# Patient Record
Sex: FEMALE | Race: WHITE | NOT HISPANIC OR LATINO | URBAN - METROPOLITAN AREA
[De-identification: names, ages, dates, MRNs, and addresses within clinical notes are randomized per-mention and may not be internally consistent; named-entity substitution may affect disease eponyms.]

---

## 2023-10-18 PROBLEM — E87.5 HYPERKALEMIA: Status: ACTIVE | Noted: 2019-06-12

## 2023-10-18 PROBLEM — Z86.0100 PERSONAL HISTORY OF COLONIC POLYPS: Status: ACTIVE | Noted: 2023-10-18

## 2023-10-18 PROBLEM — N63.0 BREAST LUMP: Status: ACTIVE | Noted: 2023-10-18

## 2023-10-18 PROBLEM — E83.52 SERUM CALCIUM ELEVATED: Status: ACTIVE | Noted: 2023-10-18

## 2023-10-18 PROBLEM — M81.0 OSTEOPOROSIS WITHOUT CURRENT PATHOLOGICAL FRACTURE: Status: ACTIVE | Noted: 2023-10-18

## 2023-10-18 PROBLEM — E55.9 VITAMIN D DEFICIENCY: Status: ACTIVE | Noted: 2023-10-18

## 2023-10-18 PROBLEM — R82.90 ABNORMAL URINE: Status: ACTIVE | Noted: 2019-12-24

## 2023-10-18 PROBLEM — N95.1 POSTMENOPAUSAL SYNDROME: Status: ACTIVE | Noted: 2023-10-18

## 2023-10-18 PROBLEM — E66.3 OVERWEIGHT: Status: ACTIVE | Noted: 2019-06-12

## 2023-10-18 PROBLEM — R19.5 PENCILLING OF STOOLS: Status: ACTIVE | Noted: 2021-01-14

## 2023-10-18 PROBLEM — R31.9 HEMATURIA: Status: ACTIVE | Noted: 2021-01-14

## 2023-10-18 PROBLEM — Z86.010 PERSONAL HISTORY OF COLONIC POLYPS: Status: ACTIVE | Noted: 2023-10-18

## 2023-10-18 PROBLEM — M81.0 AGE RELATED OSTEOPOROSIS: Status: ACTIVE | Noted: 2020-08-03

## 2023-10-18 RX ORDER — ACETAMINOPHEN 500 MG
1 TABLET ORAL DAILY
COMMUNITY

## 2023-10-18 RX ORDER — ALBUTEROL SULFATE 90 UG/1
2 AEROSOL, METERED RESPIRATORY (INHALATION) EVERY 4 HOURS PRN
COMMUNITY
End: 2024-03-31 | Stop reason: SDUPTHER

## 2023-10-18 RX ORDER — ALENDRONATE SODIUM 70 MG/1
1 TABLET ORAL
COMMUNITY
Start: 2023-04-27 | End: 2023-10-19 | Stop reason: WASHOUT

## 2023-10-19 ENCOUNTER — OFFICE VISIT (OUTPATIENT)
Dept: PRIMARY CARE | Facility: CLINIC | Age: 78
End: 2023-10-19
Payer: MEDICARE

## 2023-10-19 VITALS
TEMPERATURE: 96.6 F | BODY MASS INDEX: 21.85 KG/M2 | HEIGHT: 64 IN | HEART RATE: 80 BPM | WEIGHT: 128 LBS | OXYGEN SATURATION: 97 % | SYSTOLIC BLOOD PRESSURE: 122 MMHG | DIASTOLIC BLOOD PRESSURE: 80 MMHG

## 2023-10-19 DIAGNOSIS — H61.21 IMPACTED CERUMEN OF RIGHT EAR: Primary | ICD-10-CM

## 2023-10-19 DIAGNOSIS — H57.9 ITCHY EYES: ICD-10-CM

## 2023-10-19 PROCEDURE — 69210 REMOVE IMPACTED EAR WAX UNI: CPT | Performed by: STUDENT IN AN ORGANIZED HEALTH CARE EDUCATION/TRAINING PROGRAM

## 2023-10-19 PROCEDURE — 99213 OFFICE O/P EST LOW 20 MIN: CPT | Performed by: STUDENT IN AN ORGANIZED HEALTH CARE EDUCATION/TRAINING PROGRAM

## 2023-10-19 PROCEDURE — 1036F TOBACCO NON-USER: CPT | Performed by: STUDENT IN AN ORGANIZED HEALTH CARE EDUCATION/TRAINING PROGRAM

## 2023-10-19 PROCEDURE — 1126F AMNT PAIN NOTED NONE PRSNT: CPT | Performed by: STUDENT IN AN ORGANIZED HEALTH CARE EDUCATION/TRAINING PROGRAM

## 2023-10-19 PROCEDURE — 1159F MED LIST DOCD IN RCRD: CPT | Performed by: STUDENT IN AN ORGANIZED HEALTH CARE EDUCATION/TRAINING PROGRAM

## 2023-10-19 ASSESSMENT — PAIN SCALES - GENERAL: PAINLEVEL: 0-NO PAIN

## 2023-10-19 ASSESSMENT — PATIENT HEALTH QUESTIONNAIRE - PHQ9
SUM OF ALL RESPONSES TO PHQ9 QUESTIONS 1 AND 2: 0
2. FEELING DOWN, DEPRESSED OR HOPELESS: NOT AT ALL
1. LITTLE INTEREST OR PLEASURE IN DOING THINGS: NOT AT ALL

## 2023-10-19 NOTE — PATIENT INSTRUCTIONS
Thank you for coming to see me today.    Successful Earwax removal in clinic today.    Use over-the-counter DEBROX ear drops to help remove small amount of residual wax.    Try allergy medication such as CLARITIN or ZYRTEC for itchy eye/ear symptoms which are likely allergy-related due to recent cat exposure.

## 2023-10-19 NOTE — PROGRESS NOTES
"Subjective   Flor Soto is a 78 y.o. female who presents for ear pain, eyes itching.    HPI:     Here with complaint of R ear pain and sensation of plugging.      There is no immunization history on file for this patient.      ROS:   Review of systems is essentially negative for all systems except for any identified issues in HPI above.      There is no immunization history on file for this patient.      Objective     /80   Pulse 80   Temp 35.9 °C (96.6 °F)   Ht 1.626 m (5' 4\")   Wt 58.1 kg (128 lb)   SpO2 97%   BMI 21.97 kg/m²      PHYSICAL EXAM    GENERAL  Well-appearing, pleasant and cooperative.  No acute distress.    HEENT  HEAD:   Normocephalic.  Atraumatic.  EYES:  PERRLA.  No scleral icterus or conjunctival injection.  EARS:  R cerumen impaction.  L Tympanic membranes visualized bilaterally without erythema, fluid, or bulging.  NECK:  No adenopathy.  No palpable thyroid enlargement or nodules.    THROAT:  Moist oropharynx without tonsillar enlargement or exudates.    LUNGS:    Clear to auscultation bilaterally.  No wheezes, rales, rhonchi.    CARDIAC:  Regular rate and rhythm.  Normal S1S2.  No murmurs/rubs/gallops.    ABDOMEN:  Soft, non-tender, non-distended.  No hepatosplenomegaly.  Normoactive bowel sounds.    MUSCULOSKELETAL:  No gross abnormalities.   No joint swelling or erythema,.  No spinal or paraspinal tenderness to palpation.    EXTREMITIES:  No LE edema or cyanosis.      NEURO           Alert and oriented x3. No focal deficits.    PSYCH:          Affect appropriate.           Ear Cerumen Removal    Date/Time: 10/19/2023 10:05 AM    Performed by: Chary Whaley MD  Authorized by: Chary Whaley MD    Consent:     Consent obtained:  Verbal    Consent given by:  Patient    Risks, benefits, and alternatives were discussed: yes      Risks discussed:  Dizziness and incomplete removal    Alternatives discussed:  No treatment and referral  Universal protocol:     Procedure " explained and questions answered to patient or proxy's satisfaction: no      Patient identity confirmed:  Verbally with patient  Procedure details:     Location:  R ear    Procedure type: curette      Procedure type comment:  Irrigation followed by curette x2    Procedure outcomes: cerumen removed    Post-procedure details:     Inspection:  TM intact    Hearing quality:  Improved    Procedure completion:  Tolerated        Assessment/Plan   Problem List Items Addressed This Visit    None  Visit Diagnoses       Impacted cerumen of right ear    -  Primary    Relevant Orders    Ear Cerumen Removal    Itchy eyes            Suspect allergic cause of itchy eye symptoms.  OTC antihistimine recommended.         Chary Whaley MD

## 2024-02-09 ENCOUNTER — TELEPHONE (OUTPATIENT)
Dept: PRIMARY CARE | Facility: CLINIC | Age: 79
End: 2024-02-09
Payer: MEDICARE

## 2024-02-09 DIAGNOSIS — Z76.0 MEDICATION REFILL: ICD-10-CM

## 2024-02-09 RX ORDER — ALENDRONATE SODIUM 70 MG/1
70 TABLET ORAL
COMMUNITY
Start: 2023-11-26 | End: 2024-02-09 | Stop reason: SDUPTHER

## 2024-02-09 RX ORDER — ALENDRONATE SODIUM 70 MG/1
70 TABLET ORAL
Qty: 12 TABLET | Refills: 0 | Status: SHIPPED | OUTPATIENT
Start: 2024-02-09

## 2024-02-09 NOTE — TELEPHONE ENCOUNTER
PT states she was in around April 2023 and did her bone density scan.  Was advised that an RX would be sent at that time, but PT never picked it up.  PT requesting new RX be sent as she would like to start taking this medication.  Please send to [facility] on [address]

## 2024-03-31 ENCOUNTER — TELEPHONE (OUTPATIENT)
Dept: PRIMARY CARE | Facility: CLINIC | Age: 79
End: 2024-03-31
Payer: MEDICARE

## 2024-03-31 DIAGNOSIS — Z76.0 MEDICATION REFILL: Primary | ICD-10-CM

## 2024-03-31 RX ORDER — ALBUTEROL SULFATE 90 UG/1
2 AEROSOL, METERED RESPIRATORY (INHALATION) EVERY 4 HOURS PRN
Qty: 8 G | Refills: 0 | OUTPATIENT
Start: 2024-03-31

## 2024-03-31 NOTE — TELEPHONE ENCOUNTER
AFTER HOURS CALL: Pt called for an Albuterol HFA RF. She stated that she was experiencing SOB. Pt advised that she should be seen in UC or ER, and she stated that she just needed Albuterol RF. Rx called into pharmacy.

## 2024-04-01 NOTE — TELEPHONE ENCOUNTER
Call placed to patient, states she is doing much better and that the SOB was due to asthma. Pt scheduled medicare wellness exam for next Wednesday.

## 2024-04-01 NOTE — TELEPHONE ENCOUNTER
Noted.  Please call patient to check on symptoms/arrange follow up if needed.  Last CPE 1 year ago (3/24/2023), I would still encourage her to schedule a medicare wellness visit so we can review medications and other issues regardless.

## 2024-04-10 ENCOUNTER — APPOINTMENT (OUTPATIENT)
Dept: PRIMARY CARE | Facility: CLINIC | Age: 79
End: 2024-04-10
Payer: MEDICARE

## 2024-04-30 ENCOUNTER — OFFICE VISIT (OUTPATIENT)
Dept: PRIMARY CARE | Facility: CLINIC | Age: 79
End: 2024-04-30
Payer: MEDICARE

## 2024-04-30 VITALS
TEMPERATURE: 97.2 F | SYSTOLIC BLOOD PRESSURE: 132 MMHG | BODY MASS INDEX: 23.05 KG/M2 | WEIGHT: 135 LBS | DIASTOLIC BLOOD PRESSURE: 82 MMHG | HEIGHT: 64 IN | OXYGEN SATURATION: 98 % | HEART RATE: 88 BPM

## 2024-04-30 DIAGNOSIS — Z00.00 ANNUAL PHYSICAL EXAM: ICD-10-CM

## 2024-04-30 DIAGNOSIS — M81.0 AGE-RELATED OSTEOPOROSIS WITHOUT CURRENT PATHOLOGICAL FRACTURE: ICD-10-CM

## 2024-04-30 DIAGNOSIS — Z28.21 PNEUMOCOCCAL VACCINATION DECLINED: ICD-10-CM

## 2024-04-30 DIAGNOSIS — R63.5 WEIGHT GAIN: ICD-10-CM

## 2024-04-30 DIAGNOSIS — E55.9 VITAMIN D DEFICIENCY: ICD-10-CM

## 2024-04-30 DIAGNOSIS — H91.93 BILATERAL HEARING LOSS, UNSPECIFIED HEARING LOSS TYPE: ICD-10-CM

## 2024-04-30 DIAGNOSIS — Z00.00 MEDICARE ANNUAL WELLNESS VISIT, SUBSEQUENT: Primary | ICD-10-CM

## 2024-04-30 DIAGNOSIS — Z13.6 SCREENING FOR CARDIOVASCULAR CONDITION: ICD-10-CM

## 2024-04-30 DIAGNOSIS — L24.89 IRRITANT CONTACT DERMATITIS DUE TO OTHER AGENTS: ICD-10-CM

## 2024-04-30 DIAGNOSIS — Z28.21 TETANUS, DIPHTHERIA, AND ACELLULAR PERTUSSIS (TDAP) VACCINATION DECLINED: ICD-10-CM

## 2024-04-30 DIAGNOSIS — Z71.85 VACCINE COUNSELING: ICD-10-CM

## 2024-04-30 DIAGNOSIS — Z12.31 ENCOUNTER FOR SCREENING MAMMOGRAM FOR MALIGNANT NEOPLASM OF BREAST: ICD-10-CM

## 2024-04-30 PROCEDURE — 99397 PER PM REEVAL EST PAT 65+ YR: CPT | Performed by: STUDENT IN AN ORGANIZED HEALTH CARE EDUCATION/TRAINING PROGRAM

## 2024-04-30 PROCEDURE — 1036F TOBACCO NON-USER: CPT | Performed by: STUDENT IN AN ORGANIZED HEALTH CARE EDUCATION/TRAINING PROGRAM

## 2024-04-30 PROCEDURE — 99214 OFFICE O/P EST MOD 30 MIN: CPT | Performed by: STUDENT IN AN ORGANIZED HEALTH CARE EDUCATION/TRAINING PROGRAM

## 2024-04-30 PROCEDURE — G0439 PPPS, SUBSEQ VISIT: HCPCS | Performed by: STUDENT IN AN ORGANIZED HEALTH CARE EDUCATION/TRAINING PROGRAM

## 2024-04-30 PROCEDURE — 1159F MED LIST DOCD IN RCRD: CPT | Performed by: STUDENT IN AN ORGANIZED HEALTH CARE EDUCATION/TRAINING PROGRAM

## 2024-04-30 PROCEDURE — 99215 OFFICE O/P EST HI 40 MIN: CPT | Performed by: STUDENT IN AN ORGANIZED HEALTH CARE EDUCATION/TRAINING PROGRAM

## 2024-04-30 PROCEDURE — 1124F ACP DISCUSS-NO DSCNMKR DOCD: CPT | Performed by: STUDENT IN AN ORGANIZED HEALTH CARE EDUCATION/TRAINING PROGRAM

## 2024-04-30 PROCEDURE — 1126F AMNT PAIN NOTED NONE PRSNT: CPT | Performed by: STUDENT IN AN ORGANIZED HEALTH CARE EDUCATION/TRAINING PROGRAM

## 2024-04-30 RX ORDER — FOLIC ACID 1 MG/1
TABLET ORAL DAILY
COMMUNITY

## 2024-04-30 RX ORDER — PYRIDOXINE HCL (VITAMIN B6) 25 MG
25 TABLET ORAL DAILY
COMMUNITY

## 2024-04-30 ASSESSMENT — PATIENT HEALTH QUESTIONNAIRE - PHQ9
3. TROUBLE FALLING OR STAYING ASLEEP OR SLEEPING TOO MUCH: NOT AT ALL
2. FEELING DOWN, DEPRESSED OR HOPELESS: NOT AT ALL
9. THOUGHTS THAT YOU WOULD BE BETTER OFF DEAD, OR OF HURTING YOURSELF: NOT AT ALL
1. LITTLE INTEREST OR PLEASURE IN DOING THINGS: NOT AT ALL
4. FEELING TIRED OR HAVING LITTLE ENERGY: NOT AT ALL
1. LITTLE INTEREST OR PLEASURE IN DOING THINGS: NOT AT ALL
8. MOVING OR SPEAKING SO SLOWLY THAT OTHER PEOPLE COULD HAVE NOTICED. OR THE OPPOSITE, BEING SO FIGETY OR RESTLESS THAT YOU HAVE BEEN MOVING AROUND A LOT MORE THAN USUAL: NOT AT ALL
7. TROUBLE CONCENTRATING ON THINGS, SUCH AS READING THE NEWSPAPER OR WATCHING TELEVISION: NOT AT ALL
5. POOR APPETITE OR OVEREATING: NOT AT ALL
SUM OF ALL RESPONSES TO PHQ9 QUESTIONS 1 AND 2: 0
2. FEELING DOWN, DEPRESSED OR HOPELESS: NOT AT ALL
SUM OF ALL RESPONSES TO PHQ QUESTIONS 1-9: 0
6. FEELING BAD ABOUT YOURSELF - OR THAT YOU ARE A FAILURE OR HAVE LET YOURSELF OR YOUR FAMILY DOWN: NOT AT ALL
SUM OF ALL RESPONSES TO PHQ9 QUESTIONS 1 AND 2: 0

## 2024-04-30 ASSESSMENT — ANXIETY QUESTIONNAIRES
4. TROUBLE RELAXING: NOT AT ALL
3. WORRYING TOO MUCH ABOUT DIFFERENT THINGS: NOT AT ALL
1. FEELING NERVOUS, ANXIOUS, OR ON EDGE: NOT AT ALL
2. NOT BEING ABLE TO STOP OR CONTROL WORRYING: NOT AT ALL
5. BEING SO RESTLESS THAT IT IS HARD TO SIT STILL: NOT AT ALL
GAD7 TOTAL SCORE: 0
IF YOU CHECKED OFF ANY PROBLEMS ON THIS QUESTIONNAIRE, HOW DIFFICULT HAVE THESE PROBLEMS MADE IT FOR YOU TO DO YOUR WORK, TAKE CARE OF THINGS AT HOME, OR GET ALONG WITH OTHER PEOPLE: NOT DIFFICULT AT ALL
7. FEELING AFRAID AS IF SOMETHING AWFUL MIGHT HAPPEN: NOT AT ALL
6. BECOMING EASILY ANNOYED OR IRRITABLE: NOT AT ALL

## 2024-04-30 ASSESSMENT — PAIN SCALES - GENERAL: PAINLEVEL: 0-NO PAIN

## 2024-04-30 NOTE — PATIENT INSTRUCTIONS
Thank you for coming to see me today.    Go to the lab for fasting blood work, we will call you with all results.    Mammogram order entered today, call to schedule.    Audiology referral entered for hearing evaluation, call to schedule.    Continue taking alendronate once weekly for osteoporosis.  You will be due for a DEXA/bone density scan next year.    I suspect the skin irritation on your legs is due to a reaction from the elastic in your new socks.  I would avoid wearing the socks again in the future, can use over-the-counter cortisone cream to help with irritation.    Routine follow up/medication review with me in 6 months.

## 2024-04-30 NOTE — PROGRESS NOTES
MEDICARE WELLNESS    Chief Complaint   Patient presents with    Annual Exam       HPI:  Flor Soto is a 78 y.o. female here  for a a Medicare Wellness Visit.      Osteoporosis:  Last DEXA with osteoporosis 4/27/2023.  Taking alendronate 70 mg once weekly as prescribed.  Bisphosphonate therapy started 4/27/2023, not yet due for drug holiday.    SOB  Albuterol refilled recently. Symptoms improved.    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  Cataracts removed.  Still wears glasses to read    Social History:  Tobacco:  Never  EtOH:  No  Patient reports routine vision checks and dental cleanings, and regular exercise.     Family History   Problem Relation Name Age of Onset    Breast cancer Mother      Hyperlipidemia Father      Stroke Father      Breast cancer Sister      Hypertension Brother      Breast cancer Daughter (1)          Advanced Directives:  Patient DOES NOT have advanced directives in place.  Counseled and discussed importance with patient during visit today.  Provided assistance as necessary.    Opioid Medications:  Does the patient use opioid medications: NO  Name of medication: N/A  If yes, do they take this medicine appropriately: N/A    Overall Health:  How does the patient rate their health status today:  GOOD    Cognitive Screen:  AAAx3  to person, place and time: YES  3 word recall: Banana, Chair, Sunrise  - Immediate recall: YES  - 5 minutes recall: YES  Impression: No cognitive deficiency observed during screening or encounter today     Vision/Hearing Screen:  Vision :  No acute concerns.  Hearing screen: Feels like she eports no difficulty with hearing and passes finger rub test bilaterally    Immunizations:  COVID:  DUE  Flu:  DUE  Tdap: DUE  Pneumonia:  DUE  Shingrix:  DUE      There is no immunization history on file for this patient.    Screenings:  HCV: negative 4/4/2023 - utd  Pap:  No  longer indicated  Mammogram:  4/27/2023 WNL   Colonoscopy:  10/27/2016 diverticulosis otherwise wnl - repeat 10 year - utd  DEXA:  4/27/2023 osteoporosis - utd    Screening Pap due 21-65, Q3, Q5 with nml HPV after 29 y/o  Screening Mammogram :  yearly ages 40-75, shared decision making age >75  Screening Colonoscopy:  age 45-75, shared decision making age >75  DEXA scan 65 or 60 with risks, [id number] after  AAA screen men 65-75 men with ANY smoking history  Low dose CT of lungs:  yearly for 50-80 with at least 20 year pack history.  Current smokers and those who quit <15 years ago.  Coronary Ca score men >45, women >55,   Hep C screen:  one time all adults age 18-79        Reviewed:   Past Medical History/Allergies:  Yes  Family History:  Yes  Social History:  Yes  Current Medications:  Yes  Vital Signs:  Yes  Advanced Directives:  discussed  Immunizations:  reviewed today  Home Safety:                    Up & Go test > 30 seconds?  No                   Home have rugs; lack grab bars in bathroom; lack handrail on stairs; have poor lighting?  No                   Hearing difficulties?  No  Geriatric Assessment           ADL areas requiring assistance:  Does not need help with , Dressing, Eating, Ambulating, Toileting, Grooming, Hygiene.            IADL areas requiring assistance:  Does not need help with , Shopping, Housework, Accounting, Transportation, Driving.   Medications reviewed           Current supplements  Reviewed and recorded.   Other providers           Reviewed and recorded  Current providers and suppliers:     PHQ9/GAD7:  Over the past 2 weeks, how often have you been bothered by any of the following problems?  Trouble falling or staying asleep, or sleeping too much: Not at all  Feeling tired or having little energy: Not at all  Poor appetite or overeating: Not at all  Feeling bad about yourself - or that you are a failure or have let yourself or your family down: Not at all  Trouble concentrating on  things, such as reading the newspaper or watching television: Not at all  Moving or speaking so slowly that other people could have noticed? Or the opposite - being so fidgety or restless that you have been moving around a lot more than usual.: Not at all  Thoughts that you would be better off dead or hurting yourself in some way: Not at all  Patient Health Questionnaire-9 Score: 0  Over the last 2 weeks, how often have you been bothered by any of the following problems?  Feeling nervous, anxious, or on edge: Not at all  Not being able to stop or control worrying: Not at all  Worrying too much about different things: Not at all  Trouble relaxing: Not at all  Being so restless that it is hard to sit still: Not at all  Becoming easily annoyed or irritable: Not at all  Feeling afraid as if something awful might happen: Not at all  JENNIFER-7 Total Score: 0      Current Medications  Current Outpatient Medications   Medication Instructions    albuterol 90 mcg/actuation inhaler 2 puffs, inhalation, Every 4 hours PRN    alendronate (FOSAMAX) 70 mg, oral, Every 7 days    cholecalciferol (Vitamin D3) 50 mcg (2,000 unit) capsule 1 capsule, oral, Daily, For 30 days    folic acid (Folvite) 1 mg tablet oral, Daily    mv-min/iron/folic/calcium/vitK (WOMEN'S MULTIVITAMIN ORAL) oral, As directed    pyridoxine (VITAMIN B-6) 25 mg, oral, Daily        History  No Known Allergies   History reviewed. No pertinent past medical history.   Past Surgical History:   Procedure Laterality Date    HYSTERECTOMY       Family History   Problem Relation Name Age of Onset    Breast cancer Mother      Hyperlipidemia Father      Stroke Father      Breast cancer Sister      Hypertension Brother      Breast cancer Daughter (1)      Social History     Tobacco Use    Smoking status: Never    Smokeless tobacco: Never     Tobacco Use: Low Risk  (4/30/2024)    Patient History     Smoking Tobacco Use: Never     Smokeless Tobacco Use: Never     Passive Exposure:  Not on file        ROS  All pertinent positive symptoms are included in the history of present illness.  All other systems have been reviewed and are negative and noncontributory to this patient's current ailments.    VITAL SIGNS  Vitals:    04/30/24 1115   BP: 132/82   Pulse: 88   Temp: 36.2 °C (97.2 °F)   SpO2: 98%     Vitals:    04/30/24 1115   Weight: 61.2 kg (135 lb)      Body mass index is 23.17 kg/m².     PHYSICAL EXAM    GENERAL  Well-appearing, pleasant and cooperative.  No acute distress.    HEENT  HEAD:   Normocephalic.  Atraumatic.  EYES:  PERRLA.  No scleral icterus or conjunctival injection.  EARS:  Tympanic membranes visualized bilaterally without erythema, fluid, or bulging.  NECK:  No adenopathy.  No palpable thyroid enlargement or nodules.    THROAT:  Moist oropharynx without tonsillar enlargement or exudates.    LUNGS:    Clear to auscultation bilaterally.  No wheezes, rales, rhonchi.    CARDIAC:  Regular rate and rhythm.  Normal S1S2.  No murmurs/rubs/gallops.    ABDOMEN:  Soft, non-tender, non-distended.  No hepatosplenomegaly.  Normoactive bowel sounds.    MUSCULOSKELETAL:  No gross abnormalities.   No joint swelling or erythema,.  No spinal or paraspinal tenderness to palpation.    EXTREMITIES:  No LE edema or cyanosis.      NEURO           Alert and oriented x3. No focal deficits.    PSYCH:          Affect appropriate.     Preventative Services reviewed with patient, instructions provided    Assessment/Plan   Problem List Items Addressed This Visit       Osteoporosis without current pathological fracture     Continue alendronate weekly as prescribed.  Due for repeat DEXA scan next year.         Relevant Orders    CBC    Vitamin D deficiency    Relevant Orders    Vitamin D 25-Hydroxy,Total (for eval of Vitamin D levels)     Other Visit Diagnoses       Annual physical exam    -  Primary    Relevant Orders    Lipid Panel    Comprehensive Metabolic Panel    Screening for cardiovascular condition         Relevant Orders    Lipid Panel    Encounter for screening mammogram for malignant neoplasm of breast        Relevant Orders    BI mammo bilateral screening    Vaccine counseling        Bilateral hearing loss, unspecified hearing loss type        Relevant Orders    Referral to Audiology    Tetanus, diphtheria, and acellular pertussis (Tdap) vaccination declined        Pneumococcal vaccination declined        Irritant contact dermatitis due to other agents        Relevant Orders    CBC    Weight gain        Relevant Orders    TSH with reflex to Free T4 if abnormal    CBC                 Chary Whaley MD

## 2024-05-15 ENCOUNTER — HOSPITAL ENCOUNTER (OUTPATIENT)
Dept: RADIOLOGY | Facility: CLINIC | Age: 79
Discharge: HOME | End: 2024-05-15
Payer: MEDICARE

## 2024-05-15 VITALS — WEIGHT: 128 LBS | BODY MASS INDEX: 22.68 KG/M2 | HEIGHT: 63 IN

## 2024-05-15 DIAGNOSIS — Z12.31 ENCOUNTER FOR SCREENING MAMMOGRAM FOR MALIGNANT NEOPLASM OF BREAST: ICD-10-CM

## 2024-05-15 PROCEDURE — 77063 BREAST TOMOSYNTHESIS BI: CPT

## 2024-05-15 PROCEDURE — 77067 SCR MAMMO BI INCL CAD: CPT | Performed by: STUDENT IN AN ORGANIZED HEALTH CARE EDUCATION/TRAINING PROGRAM

## 2024-05-15 PROCEDURE — 77063 BREAST TOMOSYNTHESIS BI: CPT | Performed by: STUDENT IN AN ORGANIZED HEALTH CARE EDUCATION/TRAINING PROGRAM

## 2024-06-20 ENCOUNTER — APPOINTMENT (OUTPATIENT)
Dept: AUDIOLOGY | Facility: CLINIC | Age: 79
End: 2024-06-20
Payer: MEDICARE

## 2024-07-31 ENCOUNTER — APPOINTMENT (OUTPATIENT)
Dept: PRIMARY CARE | Facility: CLINIC | Age: 79
End: 2024-07-31
Payer: MEDICARE

## 2024-09-18 ENCOUNTER — OFFICE VISIT (OUTPATIENT)
Dept: PRIMARY CARE | Facility: CLINIC | Age: 79
End: 2024-09-18
Payer: MEDICARE

## 2024-09-18 VITALS
HEIGHT: 63 IN | OXYGEN SATURATION: 98 % | DIASTOLIC BLOOD PRESSURE: 80 MMHG | HEART RATE: 86 BPM | TEMPERATURE: 98 F | WEIGHT: 132 LBS | SYSTOLIC BLOOD PRESSURE: 122 MMHG | BODY MASS INDEX: 23.39 KG/M2

## 2024-09-18 DIAGNOSIS — M81.0 AGE RELATED OSTEOPOROSIS, UNSPECIFIED PATHOLOGICAL FRACTURE PRESENCE: ICD-10-CM

## 2024-09-18 DIAGNOSIS — B35.1 ONYCHOMYCOSIS: Primary | ICD-10-CM

## 2024-09-18 DIAGNOSIS — Z28.21 INFLUENZA VACCINATION DECLINED BY PATIENT: ICD-10-CM

## 2024-09-18 PROCEDURE — 1159F MED LIST DOCD IN RCRD: CPT | Performed by: STUDENT IN AN ORGANIZED HEALTH CARE EDUCATION/TRAINING PROGRAM

## 2024-09-18 PROCEDURE — 1126F AMNT PAIN NOTED NONE PRSNT: CPT | Performed by: STUDENT IN AN ORGANIZED HEALTH CARE EDUCATION/TRAINING PROGRAM

## 2024-09-18 PROCEDURE — 99214 OFFICE O/P EST MOD 30 MIN: CPT | Performed by: STUDENT IN AN ORGANIZED HEALTH CARE EDUCATION/TRAINING PROGRAM

## 2024-09-18 RX ORDER — CICLOPIROX 80 MG/ML
SOLUTION TOPICAL NIGHTLY
Qty: 6.6 ML | Refills: 0 | Status: SHIPPED | OUTPATIENT
Start: 2024-09-18

## 2024-09-18 ASSESSMENT — PATIENT HEALTH QUESTIONNAIRE - PHQ9
1. LITTLE INTEREST OR PLEASURE IN DOING THINGS: NOT AT ALL
SUM OF ALL RESPONSES TO PHQ9 QUESTIONS 1 AND 2: 0
2. FEELING DOWN, DEPRESSED OR HOPELESS: NOT AT ALL

## 2024-09-18 ASSESSMENT — PAIN SCALES - GENERAL: PAINLEVEL: 0-NO PAIN

## 2024-09-18 NOTE — PATIENT INSTRUCTIONS
Thank you for coming to see me today.    Topical antifungal solution sent to [facility].  Use nightly for 6 weeks as prescribed.  Use alcohol wipes to cleanse nail once weekly as well.    Go to the lab to complete fasting blood work ordered in April.  We will call you with results.    If topical solution is not working for nail fungus treatment, we will also need these results to make sure it is safe for you to take an oral antifungal medication.

## 2024-09-18 NOTE — PROGRESS NOTES
"Subjective   Flor Soto is a 79 y.o. female who presents for fungus under nail.    HPI:      This is a 79-year-old female presenting with concern for nail discoloration.  Initially scheduled appointment for illness symptoms/GI upset, however the symptoms have since resolved.    Nail Discoloration:  Noted on distal portion of little finger only.  No associated pain, finger swelling/redness or increased warmth.  No history of similar nail issues in the past.    Osteoporosis:  Taking alendronate 70 mg weekly.        ROS:   Review of systems is essentially negative for all systems except for any identified issues in HPI above.    Objective     /80   Pulse 86   Temp 36.7 °C (98 °F)   Ht 1.6 m (5' 3\")   Wt 59.9 kg (132 lb)   SpO2 98%   BMI 23.38 kg/m²      PHYSICAL EXAM    GENERAL  Well-appearing, pleasant and cooperative.  No acute distress.    HEENT  HEAD:   Normocephalic.  Atraumatic.  EYES:  No scleral icterus or conjunctival injection.  EARS:  Tympanic membranes visualized bilaterally without erythema, fluid, or bulging.    LUNGS:    Clear to auscultation bilaterally.  No wheezes, rales, rhonchi.    CARDIAC:  Regular rate and rhythm.  Normal S1S2.  No murmurs/rubs/gallops.    ABDOMEN:  Soft, non-tender, non-distended.      MUSCULOSKELETAL:  No gross abnormalities.      EXTREMITIES:  Discoloration of distal portion of 5th digit finger nail.  No LE edema or cyanosis.      NEURO           Alert and oriented x3. No focal deficits.    PSYCH:          Affect appropriate.           Assessment/Plan   Problem List Items Addressed This Visit       Age related osteoporosis     Continue alendronate 70 mg weekly as prescribed.          Other Visit Diagnoses       Onychomycosis    -  Primary    Relevant Medications    ciclopirox (Penlac) 8 % solution    Influenza vaccination declined by patient              Counseling:   Medication education:    Education: The patient is counseled regarding potential side effects " of all new medications.    Understanding:  Patient expressed understanding.    Adherence:  No barriers to adherence identified.           Chary Whaley MD

## 2025-05-07 ENCOUNTER — APPOINTMENT (OUTPATIENT)
Dept: PRIMARY CARE | Facility: CLINIC | Age: 80
End: 2025-05-07
Payer: MEDICARE